# Patient Record
(demographics unavailable — no encounter records)

---

## 2025-07-15 NOTE — HISTORY OF PRESENT ILLNESS
[de-identified] : 56-year-old female pain discomfort in the right wrist when she works for [employer].  She pain and discomfort in the wrist.  She had a cortisone injection at her last visit.  Did not significantly help her symptoms.  She comes in for the evaluation today.

## 2025-07-15 NOTE — PHYSICAL EXAM
[de-identified] : Patient is tender to palpation on the first dorsal compartment.  Positive Finkelstein's test is noted.  No erythema ecchymoses or abrasions.  No masses.  No instabilities.  No pain along the basal joint.  She does have some tenderness palpation along the basal joint of the left thumb.  With a positive axial grind at that level.

## 2025-07-15 NOTE — PROCEDURE
[FreeTextEntry3] : de Quervains injection was performed because of pain inflammation and stiffness Anesthesia: ethyl chloride sprayed topically Dexamethsone:  1 cc of 4mg/ml Lidocaine: An injection of Lidocaine 1% 1cc  Patient has tried OTC's including aspirin, Ibuprofen, Aleve etc or prescription NSAIDS, and/or exercises at home and/ or physical therapy without satisfactory response. After verbal consent using sterile preparation and technique. The risks, benefits, and alternatives to cortisone injection were explained in full to the patient. Risks outlined include but are not limited to infection, sepsis, bleeding, scarring, skin discoloration, temporary increase in pain, syncopal episode, failure to resolve symptoms, allergic reaction, symptom recurrence, and elevation of blood sugar in diabetics. Patient understood the risks. All questions were answered. After discussion of options, patient requested an injection. Oral informed consent was obtained and sterile prep was done of the injection site. Sterile technique was utilized for the procedure including the preparation of the solutions used for the injection. Patient tolerated the procedure well. Advised to ice the injection site this evening. Prep with Etoh locally to site. Sterile technique used tendon sheath of first compartment was injected Right wrist first compartment

## 2025-07-15 NOTE — ASSESSMENT
[FreeTextEntry1] : Patient has right-sided dequervains tendinitis.  Also has left-sided basal joint arthritis.  The plan would be splinting for the left thumb.  We gave her cortisone injection to the right wrist area today.  She tolerated well.  Will see how the injection does.  If it does not help her symptoms she will consider surgical intervention under local anesthesia which was discussed with her as well.  Return to work activities as a  after surgery was discussed.